# Patient Record
Sex: FEMALE | Race: WHITE | NOT HISPANIC OR LATINO | Employment: OTHER | URBAN - METROPOLITAN AREA
[De-identification: names, ages, dates, MRNs, and addresses within clinical notes are randomized per-mention and may not be internally consistent; named-entity substitution may affect disease eponyms.]

---

## 2024-08-14 ENCOUNTER — HOSPITAL ENCOUNTER (INPATIENT)
Facility: HOSPITAL | Age: 84
LOS: 1 days | Discharge: HOME | DRG: 379 | End: 2024-08-15
Attending: SPECIALIST | Admitting: SPECIALIST
Payer: MEDICARE

## 2024-08-14 DIAGNOSIS — K92.2 UPPER GI BLEED: Primary | ICD-10-CM

## 2024-08-14 DIAGNOSIS — K92.2 GI BLEED: ICD-10-CM

## 2024-08-14 DIAGNOSIS — R07.9 CHEST PAIN: ICD-10-CM

## 2024-08-14 PROBLEM — I25.10 CORONARY ARTERY DISEASE INVOLVING NATIVE CORONARY ARTERY OF NATIVE HEART: Status: ACTIVE | Noted: 2024-08-14

## 2024-08-14 PROBLEM — I10 PRIMARY HYPERTENSION: Status: ACTIVE | Noted: 2024-08-14

## 2024-08-14 PROBLEM — E78.5 HYPERLIPIDEMIA: Status: ACTIVE | Noted: 2024-08-14

## 2024-08-14 PROBLEM — I35.0 MODERATE AORTIC STENOSIS: Status: ACTIVE | Noted: 2024-08-14

## 2024-08-14 PROBLEM — E11.9 DIABETES MELLITUS TYPE 2, NONINSULIN DEPENDENT: Status: ACTIVE | Noted: 2024-08-14

## 2024-08-14 LAB
ABO + RH BLD: NORMAL
BLD GP AB SCN CELLS X3 SERPL QL: NORMAL
FERRITIN SERPL-MCNC: 66 NG/ML (ref 20–300)
INR PPP: 1 (ref 0.8–1.2)
POCT GLUCOSE: 245 MG/DL (ref 70–110)
PROTHROMBIN TIME: 11.1 SEC (ref 9–12.5)
SPECIMEN OUTDATE: NORMAL
T4 FREE SERPL-MCNC: 0.95 NG/DL (ref 0.71–1.51)
TSH SERPL DL<=0.005 MIU/L-ACNC: 4.04 UIU/ML (ref 0.4–4)

## 2024-08-14 PROCEDURE — 96375 TX/PRO/DX INJ NEW DRUG ADDON: CPT

## 2024-08-14 PROCEDURE — 84439 ASSAY OF FREE THYROXINE: CPT | Performed by: INTERNAL MEDICINE

## 2024-08-14 PROCEDURE — 82607 VITAMIN B-12: CPT | Performed by: INTERNAL MEDICINE

## 2024-08-14 PROCEDURE — 82728 ASSAY OF FERRITIN: CPT | Performed by: INTERNAL MEDICINE

## 2024-08-14 PROCEDURE — 83540 ASSAY OF IRON: CPT | Performed by: INTERNAL MEDICINE

## 2024-08-14 PROCEDURE — 86850 RBC ANTIBODY SCREEN: CPT | Performed by: INTERNAL MEDICINE

## 2024-08-14 PROCEDURE — 96372 THER/PROPH/DIAG INJ SC/IM: CPT | Performed by: INTERNAL MEDICINE

## 2024-08-14 PROCEDURE — 96374 THER/PROPH/DIAG INJ IV PUSH: CPT

## 2024-08-14 PROCEDURE — 84443 ASSAY THYROID STIM HORMONE: CPT | Performed by: INTERNAL MEDICINE

## 2024-08-14 PROCEDURE — G0378 HOSPITAL OBSERVATION PER HR: HCPCS

## 2024-08-14 PROCEDURE — 86901 BLOOD TYPING SEROLOGIC RH(D): CPT | Performed by: INTERNAL MEDICINE

## 2024-08-14 PROCEDURE — 96361 HYDRATE IV INFUSION ADD-ON: CPT

## 2024-08-14 PROCEDURE — 63600175 PHARM REV CODE 636 W HCPCS: Performed by: INTERNAL MEDICINE

## 2024-08-14 PROCEDURE — 86900 BLOOD TYPING SEROLOGIC ABO: CPT | Performed by: INTERNAL MEDICINE

## 2024-08-14 PROCEDURE — 82746 ASSAY OF FOLIC ACID SERUM: CPT | Performed by: INTERNAL MEDICINE

## 2024-08-14 PROCEDURE — 36415 COLL VENOUS BLD VENIPUNCTURE: CPT | Performed by: INTERNAL MEDICINE

## 2024-08-14 PROCEDURE — 85610 PROTHROMBIN TIME: CPT | Performed by: INTERNAL MEDICINE

## 2024-08-14 RX ORDER — NALOXONE HCL 0.4 MG/ML
0.02 VIAL (ML) INJECTION
Status: DISCONTINUED | OUTPATIENT
Start: 2024-08-14 | End: 2024-08-16 | Stop reason: HOSPADM

## 2024-08-14 RX ORDER — INSULIN ASPART 100 [IU]/ML
0-10 INJECTION, SOLUTION INTRAVENOUS; SUBCUTANEOUS EVERY 6 HOURS PRN
Status: DISCONTINUED | OUTPATIENT
Start: 2024-08-14 | End: 2024-08-16 | Stop reason: HOSPADM

## 2024-08-14 RX ORDER — SODIUM CHLORIDE, SODIUM LACTATE, POTASSIUM CHLORIDE, CALCIUM CHLORIDE 600; 310; 30; 20 MG/100ML; MG/100ML; MG/100ML; MG/100ML
INJECTION, SOLUTION INTRAVENOUS CONTINUOUS
Status: DISCONTINUED | OUTPATIENT
Start: 2024-08-14 | End: 2024-08-15

## 2024-08-14 RX ORDER — IBUPROFEN 200 MG
16 TABLET ORAL
Status: DISCONTINUED | OUTPATIENT
Start: 2024-08-14 | End: 2024-08-16 | Stop reason: HOSPADM

## 2024-08-14 RX ORDER — GLUCAGON 1 MG
1 KIT INJECTION
Status: DISCONTINUED | OUTPATIENT
Start: 2024-08-14 | End: 2024-08-16 | Stop reason: HOSPADM

## 2024-08-14 RX ORDER — ACETAMINOPHEN 650 MG/1
650 SUPPOSITORY RECTAL EVERY 6 HOURS PRN
Status: DISCONTINUED | OUTPATIENT
Start: 2024-08-14 | End: 2024-08-16 | Stop reason: HOSPADM

## 2024-08-14 RX ORDER — ONDANSETRON HYDROCHLORIDE 2 MG/ML
4 INJECTION, SOLUTION INTRAVENOUS EVERY 6 HOURS PRN
Status: DISCONTINUED | OUTPATIENT
Start: 2024-08-14 | End: 2024-08-16 | Stop reason: HOSPADM

## 2024-08-14 RX ORDER — PANTOPRAZOLE SODIUM 40 MG/10ML
40 INJECTION, POWDER, LYOPHILIZED, FOR SOLUTION INTRAVENOUS 2 TIMES DAILY
Status: DISCONTINUED | OUTPATIENT
Start: 2024-08-14 | End: 2024-08-16 | Stop reason: HOSPADM

## 2024-08-14 RX ORDER — IBUPROFEN 200 MG
24 TABLET ORAL
Status: DISCONTINUED | OUTPATIENT
Start: 2024-08-14 | End: 2024-08-16 | Stop reason: HOSPADM

## 2024-08-14 RX ORDER — HYDRALAZINE HYDROCHLORIDE 20 MG/ML
10 INJECTION INTRAMUSCULAR; INTRAVENOUS EVERY 6 HOURS PRN
Status: DISCONTINUED | OUTPATIENT
Start: 2024-08-14 | End: 2024-08-16 | Stop reason: HOSPADM

## 2024-08-14 RX ORDER — SODIUM CHLORIDE 0.9 % (FLUSH) 0.9 %
10 SYRINGE (ML) INJECTION EVERY 12 HOURS PRN
Status: DISCONTINUED | OUTPATIENT
Start: 2024-08-14 | End: 2024-08-16 | Stop reason: HOSPADM

## 2024-08-14 RX ADMIN — PANTOPRAZOLE SODIUM 40 MG: 40 INJECTION, POWDER, FOR SOLUTION INTRAVENOUS at 09:08

## 2024-08-14 RX ADMIN — INSULIN ASPART 2 UNITS: 100 INJECTION, SOLUTION INTRAVENOUS; SUBCUTANEOUS at 11:08

## 2024-08-14 RX ADMIN — HYDRALAZINE HYDROCHLORIDE 10 MG: 20 INJECTION, SOLUTION INTRAMUSCULAR; INTRAVENOUS at 09:08

## 2024-08-14 RX ADMIN — SODIUM CHLORIDE, POTASSIUM CHLORIDE, SODIUM LACTATE AND CALCIUM CHLORIDE: 600; 310; 30; 20 INJECTION, SOLUTION INTRAVENOUS at 09:08

## 2024-08-15 ENCOUNTER — TELEPHONE (OUTPATIENT)
Dept: PREADMISSION TESTING | Facility: HOSPITAL | Age: 84
End: 2024-08-15
Payer: MEDICARE

## 2024-08-15 VITALS
WEIGHT: 150.13 LBS | DIASTOLIC BLOOD PRESSURE: 51 MMHG | TEMPERATURE: 97 F | RESPIRATION RATE: 18 BRPM | OXYGEN SATURATION: 98 % | HEIGHT: 63 IN | BODY MASS INDEX: 26.6 KG/M2 | SYSTOLIC BLOOD PRESSURE: 97 MMHG | HEART RATE: 59 BPM

## 2024-08-15 DIAGNOSIS — K92.1 MELENA: Primary | ICD-10-CM

## 2024-08-15 DIAGNOSIS — D62 ACUTE BLOOD LOSS ANEMIA: ICD-10-CM

## 2024-08-15 LAB
ALBUMIN SERPL BCP-MCNC: 3.5 G/DL (ref 3.5–5.2)
ALP SERPL-CCNC: 80 U/L (ref 55–135)
ALT SERPL W/O P-5'-P-CCNC: 15 U/L (ref 10–44)
ANION GAP SERPL CALC-SCNC: 10 MMOL/L (ref 8–16)
AST SERPL-CCNC: 14 U/L (ref 10–40)
BASOPHILS # BLD AUTO: 0.05 K/UL (ref 0–0.2)
BASOPHILS # BLD AUTO: 0.06 K/UL (ref 0–0.2)
BASOPHILS NFR BLD: 0.5 % (ref 0–1.9)
BASOPHILS NFR BLD: 0.6 % (ref 0–1.9)
BILIRUB SERPL-MCNC: 0.7 MG/DL (ref 0.1–1)
BUN SERPL-MCNC: 20 MG/DL (ref 8–23)
CALCIUM SERPL-MCNC: 8.9 MG/DL (ref 8.7–10.5)
CHLORIDE SERPL-SCNC: 109 MMOL/L (ref 95–110)
CO2 SERPL-SCNC: 23 MMOL/L (ref 23–29)
CREAT SERPL-MCNC: 0.8 MG/DL (ref 0.5–1.4)
DIFFERENTIAL METHOD BLD: ABNORMAL
DIFFERENTIAL METHOD BLD: ABNORMAL
EOSINOPHIL # BLD AUTO: 0.1 K/UL (ref 0–0.5)
EOSINOPHIL # BLD AUTO: 0.2 K/UL (ref 0–0.5)
EOSINOPHIL NFR BLD: 0.9 % (ref 0–8)
EOSINOPHIL NFR BLD: 2.4 % (ref 0–8)
ERYTHROCYTE [DISTWIDTH] IN BLOOD BY AUTOMATED COUNT: 13.1 % (ref 11.5–14.5)
ERYTHROCYTE [DISTWIDTH] IN BLOOD BY AUTOMATED COUNT: 13.1 % (ref 11.5–14.5)
EST. GFR  (NO RACE VARIABLE): >60 ML/MIN/1.73 M^2
FOLATE SERPL-MCNC: 9.7 NG/ML (ref 4–24)
GLUCOSE SERPL-MCNC: 199 MG/DL (ref 70–110)
HCT VFR BLD AUTO: 27.5 % (ref 37–48.5)
HCT VFR BLD AUTO: 28.8 % (ref 37–48.5)
HCT VFR BLD AUTO: 28.8 % (ref 37–48.5)
HCT VFR BLD AUTO: 29.2 % (ref 37–48.5)
HGB BLD-MCNC: 9.2 G/DL (ref 12–16)
HGB BLD-MCNC: 9.3 G/DL (ref 12–16)
IMM GRANULOCYTES # BLD AUTO: 0.05 K/UL (ref 0–0.04)
IMM GRANULOCYTES # BLD AUTO: 0.09 K/UL (ref 0–0.04)
IMM GRANULOCYTES NFR BLD AUTO: 0.5 % (ref 0–0.5)
IMM GRANULOCYTES NFR BLD AUTO: 0.8 % (ref 0–0.5)
IRON SERPL-MCNC: 52 UG/DL (ref 30–160)
LYMPHOCYTES # BLD AUTO: 1.2 K/UL (ref 1–4.8)
LYMPHOCYTES # BLD AUTO: 2 K/UL (ref 1–4.8)
LYMPHOCYTES NFR BLD: 11.1 % (ref 18–48)
LYMPHOCYTES NFR BLD: 20.2 % (ref 18–48)
MCH RBC QN AUTO: 28.1 PG (ref 27–31)
MCH RBC QN AUTO: 28.4 PG (ref 27–31)
MCHC RBC AUTO-ENTMCNC: 32.3 G/DL (ref 32–36)
MCHC RBC AUTO-ENTMCNC: 33.5 G/DL (ref 32–36)
MCV RBC AUTO: 85 FL (ref 82–98)
MCV RBC AUTO: 87 FL (ref 82–98)
MONOCYTES # BLD AUTO: 0.5 K/UL (ref 0.3–1)
MONOCYTES # BLD AUTO: 0.7 K/UL (ref 0.3–1)
MONOCYTES NFR BLD: 4.7 % (ref 4–15)
MONOCYTES NFR BLD: 7.3 % (ref 4–15)
NEUTROPHILS # BLD AUTO: 6.6 K/UL (ref 1.8–7.7)
NEUTROPHILS # BLD AUTO: 8.8 K/UL (ref 1.8–7.7)
NEUTROPHILS NFR BLD: 69 % (ref 38–73)
NEUTROPHILS NFR BLD: 82 % (ref 38–73)
NRBC BLD-RTO: 0 /100 WBC
NRBC BLD-RTO: 0 /100 WBC
PLATELET # BLD AUTO: 211 K/UL (ref 150–450)
PLATELET # BLD AUTO: 220 K/UL (ref 150–450)
PMV BLD AUTO: 9.7 FL (ref 9.2–12.9)
PMV BLD AUTO: 9.8 FL (ref 9.2–12.9)
POCT GLUCOSE: 191 MG/DL (ref 70–110)
POCT GLUCOSE: 252 MG/DL (ref 70–110)
POCT GLUCOSE: 399 MG/DL (ref 70–110)
POTASSIUM SERPL-SCNC: 3.8 MMOL/L (ref 3.5–5.1)
PROT SERPL-MCNC: 6.4 G/DL (ref 6–8.4)
RBC # BLD AUTO: 3.24 M/UL (ref 4–5.4)
RBC # BLD AUTO: 3.31 M/UL (ref 4–5.4)
SATURATED IRON: 14 % (ref 20–50)
SODIUM SERPL-SCNC: 142 MMOL/L (ref 136–145)
TOTAL IRON BINDING CAPACITY: 364 UG/DL (ref 250–450)
TRANSFERRIN SERPL-MCNC: 246 MG/DL (ref 200–375)
VIT B12 SERPL-MCNC: 320 PG/ML (ref 210–950)
WBC # BLD AUTO: 10.69 K/UL (ref 3.9–12.7)
WBC # BLD AUTO: 9.63 K/UL (ref 3.9–12.7)

## 2024-08-15 PROCEDURE — 21400001 HC TELEMETRY ROOM

## 2024-08-15 PROCEDURE — 36415 COLL VENOUS BLD VENIPUNCTURE: CPT | Mod: XB | Performed by: INTERNAL MEDICINE

## 2024-08-15 PROCEDURE — 63600175 PHARM REV CODE 636 W HCPCS: Performed by: INTERNAL MEDICINE

## 2024-08-15 PROCEDURE — 85025 COMPLETE CBC W/AUTO DIFF WBC: CPT | Mod: 91 | Performed by: INTERNAL MEDICINE

## 2024-08-15 PROCEDURE — 85018 HEMOGLOBIN: CPT | Performed by: INTERNAL MEDICINE

## 2024-08-15 PROCEDURE — 25000003 PHARM REV CODE 250: Performed by: INTERNAL MEDICINE

## 2024-08-15 PROCEDURE — 99223 1ST HOSP IP/OBS HIGH 75: CPT | Mod: ,,, | Performed by: PHYSICIAN ASSISTANT

## 2024-08-15 PROCEDURE — 96361 HYDRATE IV INFUSION ADD-ON: CPT

## 2024-08-15 PROCEDURE — 36415 COLL VENOUS BLD VENIPUNCTURE: CPT | Performed by: INTERNAL MEDICINE

## 2024-08-15 PROCEDURE — 80053 COMPREHEN METABOLIC PANEL: CPT | Performed by: INTERNAL MEDICINE

## 2024-08-15 PROCEDURE — 85025 COMPLETE CBC W/AUTO DIFF WBC: CPT | Performed by: INTERNAL MEDICINE

## 2024-08-15 PROCEDURE — 85014 HEMATOCRIT: CPT | Performed by: INTERNAL MEDICINE

## 2024-08-15 RX ORDER — CARVEDILOL 12.5 MG/1
25 TABLET ORAL 2 TIMES DAILY WITH MEALS
Status: DISCONTINUED | OUTPATIENT
Start: 2024-08-15 | End: 2024-08-16 | Stop reason: HOSPADM

## 2024-08-15 RX ORDER — METFORMIN HYDROCHLORIDE 500 MG/1
500 TABLET, EXTENDED RELEASE ORAL 2 TIMES DAILY WITH MEALS
COMMUNITY

## 2024-08-15 RX ORDER — ATORVASTATIN CALCIUM 40 MG/1
80 TABLET, FILM COATED ORAL DAILY
Status: DISCONTINUED | OUTPATIENT
Start: 2024-08-15 | End: 2024-08-16 | Stop reason: HOSPADM

## 2024-08-15 RX ORDER — GLIPIZIDE 10 MG/1
10 TABLET ORAL
COMMUNITY

## 2024-08-15 RX ORDER — PANTOPRAZOLE SODIUM 40 MG/1
40 TABLET, DELAYED RELEASE ORAL DAILY
Qty: 30 TABLET | Refills: 0 | Status: SHIPPED | OUTPATIENT
Start: 2024-08-15 | End: 2024-08-23

## 2024-08-15 RX ORDER — MAGNESIUM SULFATE HEPTAHYDRATE 40 MG/ML
2 INJECTION, SOLUTION INTRAVENOUS ONCE
Status: COMPLETED | OUTPATIENT
Start: 2024-08-15 | End: 2024-08-15

## 2024-08-15 RX ORDER — LISINOPRIL 40 MG/1
40 TABLET ORAL DAILY
COMMUNITY

## 2024-08-15 RX ORDER — SERTRALINE HYDROCHLORIDE 50 MG/1
50 TABLET, FILM COATED ORAL DAILY
Status: DISCONTINUED | OUTPATIENT
Start: 2024-08-15 | End: 2024-08-16 | Stop reason: HOSPADM

## 2024-08-15 RX ORDER — CARVEDILOL 25 MG/1
25 TABLET ORAL 2 TIMES DAILY WITH MEALS
COMMUNITY

## 2024-08-15 RX ORDER — SERTRALINE HYDROCHLORIDE 50 MG/1
50 TABLET, FILM COATED ORAL DAILY
COMMUNITY

## 2024-08-15 RX ORDER — ATORVASTATIN CALCIUM 80 MG/1
80 TABLET, FILM COATED ORAL DAILY
COMMUNITY

## 2024-08-15 RX ORDER — CLOPIDOGREL BISULFATE 75 MG/1
75 TABLET ORAL DAILY
COMMUNITY

## 2024-08-15 RX ADMIN — MAGNESIUM SULFATE HEPTAHYDRATE 2 G: 40 INJECTION, SOLUTION INTRAVENOUS at 08:08

## 2024-08-15 RX ADMIN — HYDRALAZINE HYDROCHLORIDE 10 MG: 20 INJECTION, SOLUTION INTRAMUSCULAR; INTRAVENOUS at 09:08

## 2024-08-15 RX ADMIN — ATORVASTATIN CALCIUM 80 MG: 40 TABLET, FILM COATED ORAL at 10:08

## 2024-08-15 RX ADMIN — PANTOPRAZOLE SODIUM 40 MG: 40 INJECTION, POWDER, FOR SOLUTION INTRAVENOUS at 09:08

## 2024-08-15 RX ADMIN — SERTRALINE HYDROCHLORIDE 50 MG: 50 TABLET ORAL at 10:08

## 2024-08-15 RX ADMIN — INSULIN ASPART 10 UNITS: 100 INJECTION, SOLUTION INTRAVENOUS; SUBCUTANEOUS at 12:08

## 2024-08-15 RX ADMIN — CARVEDILOL 25 MG: 12.5 TABLET, FILM COATED ORAL at 10:08

## 2024-08-15 NOTE — HPI
The patient presented to an outside ER with a two days history of black stool and weakness. She was found to have a Hgb of 9.8 which was down from 12.1 on 06/11/2024. MCV, PLT and creatinine normal with BUN 31. She was given 80 mg IV Protonix and transferred here for GI services. Hgb has remained stable. She is hemodynamically stable. No further melena. She denies nausea, vomiting or abdominal pain. She takes Plavix daily for a history of CAD and PVD. No recent acute events. Denies NSAID use. Reports having an EGD more than five years ago with gastritis.

## 2024-08-15 NOTE — ASSESSMENT & PLAN NOTE
Chronic, uncontrolled. Latest blood pressure and vitals reviewed-     Temp:  [97.5 °F (36.4 °C)]   Pulse:  [75-80]   Resp:  [18]   BP: (135-154)/(57-75)   SpO2:  [96 %] .   Home meds for hypertension were reviewed and noted below.       While in the hospital, will manage blood pressure as follows; Adjust home antihypertensive regimen as follows- hold lisinopril and Coreg in the setting of upper GI bleed.  P.r.n. IV hydralazine with parameters.  If blood pressure remains elevated, consider scheduled IV metoprolol versus clonidine patch.    Will utilize p.r.n. blood pressure medication only if patient's blood pressure greater than 160/100 and she develops symptoms such as worsening chest pain or shortness of breath.

## 2024-08-15 NOTE — ASSESSMENT & PLAN NOTE
Patient with known CAD s/p CABG, which is controlled.  Currently NPO due to GI bleed--> held lisinopril, Coreg, Plavix, and Lipitor.  Monitor for S/Sx of angina/ACS. Continue to monitor on telemetry.

## 2024-08-15 NOTE — TELEPHONE ENCOUNTER
----- Message from Pan Pena PA-C sent at 8/15/2024 10:11 AM CDT -----  FYI, she is scheduled for outpatient procedure on Tuesday. She is in hospital now and we are having her hold plavix.   Pan

## 2024-08-15 NOTE — PLAN OF CARE
Problem: Adult Inpatient Plan of Care  Goal: Plan of Care Review  Reactivated  Goal: Patient-Specific Goal (Individualized)  Reactivated  Goal: Absence of Hospital-Acquired Illness or Injury  Reactivated  Goal: Optimal Comfort and Wellbeing  Reactivated  Goal: Readiness for Transition of Care  Reactivated     Problem: Diabetes Comorbidity  Goal: Blood Glucose Level Within Targeted Range  Reactivated     Problem: Fall Injury Risk  Goal: Absence of Fall and Fall-Related Injury  Reactivated     Problem: Gastrointestinal Bleeding  Goal: Optimal Coping with Acute Illness  Reactivated  Goal: Hemostasis  Reactivated

## 2024-08-15 NOTE — PLAN OF CARE
A246/A246 YAYA Perez is a 84 y.o.female admitted on 8/14/2024 for Upper GI bleed   Code Status: Full Code MRN: [id number]   Review of patient's allergies indicates:  No Known Allergies  Past Medical History:   Diagnosis Date    Bilateral carotid artery stenosis     Coronary artery disease     Diabetes mellitus     Hyperlipidemia     Hypertension     Moderate aortic stenosis     PVD (peripheral vascular disease)       PRN meds    acetaminophen, 650 mg, Q6H PRN  dextrose 10%, 12.5 g, PRN  dextrose 10%, 12.5 g, PRN  dextrose 10%, 25 g, PRN  dextrose 10%, 25 g, PRN  glucagon (human recombinant), 1 mg, PRN  glucagon (human recombinant), 1 mg, PRN  glucose, 16 g, PRN  glucose, 24 g, PRN  hydrALAZINE, 10 mg, Q6H PRN  insulin aspart U-100, 0-10 Units, Q6H PRN  naloxone, 0.02 mg, PRN  ondansetron, 4 mg, Q6H PRN  sodium chloride 0.9%, 10 mL, Q12H PRN      Discharge instructions received and reviewed with pt and family at bedside.  Pt voiced understanding and all questions answered to satisfaction.  Stressed importance to making and keeping all follow up appointments.  Medications sent to pt pharmacy and reviewed with pt.  Tele monitor removed and brought to monitor tech.  IV d/c'd with tip intact, pressure dressing applied.  Pt transported to front of hospital via w/c by PCT to be discharged home.                 Lead Monitored: Lead II Rhythm: normal sinus rhythm    Cardiac/Telemetry Box Number: [id number]  VTE Required Core Measure: (SCDs) Sequential compression device initiated/maintained Last Bowel Movement: 08/14/24  Diet Cardiac Consistent Carbohydrate  Diet diabetic  Diet Cardiac     Joe Score: 21  Fall Risk Score: 13  Accucheck [x]   Freq? ACHS     Lines/Drains/Airways       Peripheral Intravenous Line  Duration                  Peripheral IV - Single Lumen 08/14/24 1600 20 G Left Antecubital 1 day

## 2024-08-15 NOTE — DISCHARGE SUMMARY
[facility] Medicine  Discharge Summary      Patient Name: Gia Perez  MRN: [id number]  ALEKSANDRA: [id number]  Patient Class: IP- Inpatient  Admission Date: 8/14/2024  Hospital Length of Stay: 1 days  Discharge Date and Time:  08/15/2024 5:37 PM  Attending Physician: Laquita Blandon MD   Discharging Provider: Laquita Blandon MD  Primary Care Provider: Rashad Luna MD    Primary Care Team: Networked reference to record PCT     HPI:   84-year-old white woman with history of coronary artery disease with remote coronary artery bypass graft, hypertension, hyperlipidemia, bilateral carotid artery stenosis, moderate aortic stenosis, non-insulin-dependent diabetes mellitus type 2, and peripheral vascular disease who was transferred from [facility] to our facility as patient requires GI consult for upper GI bleed.  Patient had presented with complaint of black tarry stools and generalized weakness over the last 2 days, symptoms were constant, and moderate severity, no aggravating or alleviating factors identified.  Patient has had some associated mild epigastric abdominal pain.  She denies hematochezia, hemoptysis, or hematemesis.  She has had no nausea, vomiting, fevers, chills.  Patient denies alcohol or NSAID use.  She is on Plavix for known heart disease.    * No surgery found *      Hospital Course:   Pt had no further melena after admission, no overt s/s of bleeding.     She was evaluated by GI who recommended outpatient EGD after patient has been off Plavix x 5  days as she has had no further bleeding and H/H stable at 9.2-9.3- discussed with GI Dr. Valencia- pt deemed stable for discharge from GI standpoint. Pt is scheduled for outpatient endoscopy with Dr. Cotto on 08/20/2024. Started on Protonix 40 daily x 30 days on discharge.     Pt seen and examined on day of discharge with son at bedside. She is sitting up in chair, in NAD. Denies nausea, vomiting. Had formed  BM earlier this afternoon. Appears stable for discharge home today per my exam with close outpatient GI follow up as above. Advised to hold Plavix on discharge until GI followup. Discharge instructions, medications, followup and return precautions discussed at length with patient and son, all questions answered.      Goals of Care Treatment Preferences:  Code Status: Full Code         Consults:   Consults (From admission, onward)          Status Ordering Provider     Inpatient consult to Diabetes educator  Once        Provider:  (Not yet assigned)    Completed GITA SWANSON     Inpatient consult to Gastroenterology  Once        Provider:  Talita Valencia MD    Completed LOUIS DINH new Assessment & Plan notes have been filed under this hospital service since the last note was generated.  Service: Hospital Medicine    Final Active Diagnoses:    Diagnosis Date Noted POA    PRINCIPAL PROBLEM:  Upper GI bleed [K92.2] 08/14/2024 Yes    Coronary artery disease involving native coronary artery of native heart [I25.10] 08/14/2024 Yes    Primary hypertension [I10] 08/14/2024 Yes    Diabetes mellitus type 2, noninsulin dependent [E11.9] 08/14/2024 Yes    Hyperlipidemia [E78.5] 08/14/2024 Yes    Moderate aortic stenosis [I35.0] 08/14/2024 Yes      Problems Resolved During this Admission:       Discharged Condition: stable    Disposition: Home or Self Care    Follow Up:   Follow-up Information       Rashad Luna MD. Schedule an appointment as soon as possible for a visit in 3 day(s).    Specialty: Family Medicine  Contact information:  [address] [place] [id number] [phone]               Kate Cotto MD Follow up on 8/20/2024.    Specialty: Gastroenterology  Why: follow up as scheduled for upper endoscopy  Contact information:  [address] [facility] [address] [phone]                           Patient Instructions:      Diet diabetic     Diet  Cardiac     Notify your health care provider if you experience any of the following:   Order Comments: Any signs or symptoms of bleeding     Activity as tolerated       Significant Diagnostic Studies: See Hospital Course     Pending Diagnostic Studies:       None           Medications:  Reconciled Home Medications:      Medication List        START taking these medications      pantoprazole 40 MG tablet  Commonly known as: PROTONIX  Take 1 tablet (40 mg total) by mouth once daily.            CONTINUE taking these medications      atorvastatin 80 MG tablet  Commonly known as: LIPITOR  Take 80 mg by mouth once daily.     carvediloL 25 MG tablet  Commonly known as: COREG  Take 25 mg by mouth 2 (two) times daily with meals.     clopidogreL 75 mg tablet  Commonly known as: PLAVIX  Take 75 mg by mouth once daily.  Notes to patient: DO NOT TAKE UNTIL AFTER EGD IS COMPLETED      glipiZIDE 10 MG tablet  Commonly known as: GLUCOTROL  Take 10 mg by mouth 2 (two) times daily before meals.     lisinopriL 40 MG tablet  Commonly known as: PRINIVIL,ZESTRIL  Take 40 mg by mouth once daily.  Notes to patient: Please check BP before restarting, ok to restart if BP > 120      metFORMIN 500 MG ER 24hr tablet  Commonly known as: GLUCOPHAGE-XR  Take 500 mg by mouth 2 (two) times daily with meals.     sertraline 50 MG tablet  Commonly known as: ZOLOFT  Take 50 mg by mouth once daily.              Indwelling Lines/Drains at time of discharge:   Lines/Drains/Airways       None                   Time spent on the discharge of patient: 37 minutes         Laquita Blandon MD  Department of Hospital Medicine  [facility] - Telemetry ([facility])

## 2024-08-15 NOTE — H&P
[facility] Medicine  History & Physical    Patient Name: Gia Perez  MRN: [id number]  Patient Class: IP- Inpatient  Admission Date: 8/14/2024  Attending Physician:  Zenaida Kahn MD  Primary Care Provider: Rashad Luna MD         Patient information was obtained from patient, ER records, and ER physician .     Subjective:     Principal Problem:<principal problem not specified>    Chief Complaint:   Chief Complaint   Patient presents with    Transferred from outside hospital for upper GI bleed requir        HPI: 84-year-old white woman with history of coronary artery disease with remote coronary artery bypass graft, hypertension, hyperlipidemia, bilateral carotid artery stenosis, moderate aortic stenosis, non-insulin-dependent diabetes mellitus type 2, and peripheral vascular disease who was transferred from [facility] to our facility as patient requires GI consult for upper GI bleed.  Patient had presented with complaint of black tarry stools and generalized weakness over the last 2 days, symptoms were constant, and moderate severity, no aggravating or alleviating factors identified.  Patient has had some associated mild epigastric abdominal pain.  She denies hematochezia, hemoptysis, or hematemesis.  She has had no nausea, vomiting, fevers, chills.  Patient denies alcohol or NSAID use.  She is on Plavix for known heart disease.    Past Medical History:   Diagnosis Date    Bilateral carotid artery stenosis     Coronary artery disease     Diabetes mellitus     Hyperlipidemia     Hypertension     Moderate aortic stenosis     PVD (peripheral vascular disease)        Past Surgical History:   Procedure Laterality Date    CORONARY ARTERY BYPASS GRAFT         Review of patient's allergies indicates:  No Known Allergies    No current facility-administered medications on file prior to encounter.     No current outpatient medications on file prior to encounter.     Family  History       Problem Relation (Age of Onset)    Heart disease Mother          Tobacco Use    Smoking status: Never    Smokeless tobacco: Never   Substance and Sexual Activity    Alcohol use: Not Currently    Drug use: Never    Sexual activity: Not on file     Review of Systems   Constitutional:  Negative for chills and fever.   Gastrointestinal:  Positive for abdominal distention and blood in stool. Negative for nausea and vomiting.   Neurological:  Positive for weakness. Negative for syncope.   All other systems reviewed and are negative.    Objective:     Vital Signs (Most Recent):    Vital Signs (24h Range):  Temp:  [97.5 °F (36.4 °C)] 97.5 °F (36.4 °C)  Pulse:  [75-80] 80  Resp:  [18] 18  SpO2:  [96 %] 96 %  BP: (135-154)/(57-75) 135/57        There is no height or weight on file to calculate BMI.     Physical Exam  Vitals reviewed.   Constitutional:       General: She is not in acute distress.     Appearance: She is not ill-appearing.   HENT:      Nose: Nose normal.   Eyes:      Conjunctiva/sclera: Conjunctivae normal.   Cardiovascular:      Rate and Rhythm: Normal rate.   Pulmonary:      Effort: Pulmonary effort is normal. No respiratory distress.   Abdominal:      General: There is no distension.      Tenderness: There is no abdominal tenderness.   Musculoskeletal:         General: No swelling.   Skin:     General: Skin is warm and dry.   Neurological:      Mental Status: She is alert and oriented to person, place, and time.   Psychiatric:         Mood and Affect: Mood normal.         Behavior: Behavior normal.                Significant Labs: All pertinent labs within the past 24 hours have been reviewed.  Recent Lab Results         08/14/24  1243        Magnesium  1.4               Significant Imaging: I have reviewed all pertinent imaging results/findings within the past 24 hours.  No orders to display      No orders to display      Assessment/Plan:     Upper GI bleed  Upper GI bleed with melena,  generalized weakness, and symptomatic anemia.  Patient has acute blood loss due to hemorrhage, the hemorrhage is due to gastrointestinal bleed, patient does have a propensity for bleeding due to a medication, the medication is Plavix.. Will trend hemoglobin/hematocrit Every 6 hours x4, as well as monitor and correct for any coagulation defects. CBC and vital signs have been reviewed and last CBC was noted- H&H at outside hospital was 9.8 and 29.6, previously with a hemoglobin of 12.1 on 06/11/2024      Will order a type and screen and consent patient for blood transfusion. Will transfuse if Hgb is <7g/dl (<8g/dl in cases of active ACS) or if patient has rapid bleeding leading to hemodynamic instability.  -check H&H q.6 hours x4  -check CBC for a.m.  -check iron studies, B12, folate, TSH, and INR  -patient received Protonix 80 mg IV x1 dose at outside hospital  -NPO, cautious IV fluids given advanced age, and Protonix 40 mg IV b.i.d.  -GI consult for a.m.  -hold Plavix  -fall precautions, telemetry monitoring    Coronary artery disease involving native coronary artery of native heart  Patient with known CAD s/p CABG, which is controlled.  Currently NPO due to GI bleed--> held lisinopril, Coreg, Plavix, and Lipitor.  Monitor for S/Sx of angina/ACS. Continue to monitor on telemetry.     Primary hypertension  Chronic, uncontrolled. Latest blood pressure and vitals reviewed-     Temp:  [97.5 °F (36.4 °C)]   Pulse:  [75-80]   Resp:  [18]   BP: (135-154)/(57-75)   SpO2:  [96 %] .   Home meds for hypertension were reviewed and noted below.       While in the hospital, will manage blood pressure as follows; Adjust home antihypertensive regimen as follows- hold lisinopril and Coreg in the setting of upper GI bleed.  P.r.n. IV hydralazine with parameters.  If blood pressure remains elevated, consider scheduled IV metoprolol versus clonidine patch.    Will utilize p.r.n. blood pressure medication only if patient's blood  "pressure greater than 160/100 and she develops symptoms such as worsening chest pain or shortness of breath.    Diabetes mellitus type 2, noninsulin dependent  Patient's FSGs are uncontrolled due to hyperglycemia on current medication regimen.  Last A1c reviewed-   Lab Results   Component Value Date    HGBA1C 9.0 (H) 06/11/2024     Most recent fingerstick glucose reviewed- No results for input(s): "POCTGLUCOSE" in the last 24 hours.  Current correctional scale  Medium  Maintain anti-hyperglycemic dose as follows-   Antihyperglycemics (From admission, onward)      Start     Stop Route Frequency Ordered    08/14/24 2206  insulin aspart U-100 pen 0-10 Units         -- SubQ Every 6 hours PRN 08/14/24 2106          Hold Oral hypoglycemics while patient is in the hospital.  -hold glipizide and Glucophage  -given significantly elevated A1c, patient would likely benefit from basal insulin at discharge  -currently NPO with cautious IV fluids    Hyperlipidemia  Lipitor held while NPO.  Recent lipid panel 06/11/2024 with total cholesterol 153, triglycerides 277, HDL 33, and LDL 75.      Moderate aortic stenosis  Outpatient follow up with Cardiology.  Monitor volume status.      VTE Risk Mitigation (From admission, onward)           Ordered     Reason for No Pharmacological VTE Prophylaxis  Once        Question:  Reasons:  Answer:  Active Bleeding    08/14/24 2106     IP VTE HIGH RISK PATIENT  Once         08/14/24 2106     Place sequential compression device  Until discontinued         08/14/24 2106                                    Zenaida Kahn MD  Department of Hospital Medicine  [facility] - Telemetry ([facility])          "

## 2024-08-15 NOTE — HPI
84-year-old white woman with history of coronary artery disease with remote coronary artery bypass graft, hypertension, hyperlipidemia, bilateral carotid artery stenosis, moderate aortic stenosis, non-insulin-dependent diabetes mellitus type 2, and peripheral vascular disease who was transferred from [facility] to our facility as patient requires GI consult for upper GI bleed.  Patient had presented with complaint of black tarry stools and generalized weakness over the last 2 days, symptoms were constant, and moderate severity, no aggravating or alleviating factors identified.  Patient has had some associated mild epigastric abdominal pain.  She denies hematochezia, hemoptysis, or hematemesis.  She has had no nausea, vomiting, fevers, chills.  Patient denies alcohol or NSAID use.  She is on Plavix for known heart disease.

## 2024-08-15 NOTE — SUBJECTIVE & OBJECTIVE
Past Medical History:   Diagnosis Date    Bilateral carotid artery stenosis     Coronary artery disease     Diabetes mellitus     Hyperlipidemia     Hypertension     Moderate aortic stenosis     PVD (peripheral vascular disease)        Past Surgical History:   Procedure Laterality Date    CORONARY ARTERY BYPASS GRAFT         Review of patient's allergies indicates:  No Known Allergies    No current facility-administered medications on file prior to encounter.     No current outpatient medications on file prior to encounter.     Family History       Problem Relation (Age of Onset)    Heart disease Mother          Tobacco Use    Smoking status: Never    Smokeless tobacco: Never   Substance and Sexual Activity    Alcohol use: Not Currently    Drug use: Never    Sexual activity: Not on file     Review of Systems   Constitutional:  Negative for chills and fever.   Gastrointestinal:  Positive for abdominal distention and blood in stool. Negative for nausea and vomiting.   Neurological:  Positive for weakness. Negative for syncope.   All other systems reviewed and are negative.    Objective:     Vital Signs (Most Recent):    Vital Signs (24h Range):  Temp:  [97.5 °F (36.4 °C)] 97.5 °F (36.4 °C)  Pulse:  [75-80] 80  Resp:  [18] 18  SpO2:  [96 %] 96 %  BP: (135-154)/(57-75) 135/57        There is no height or weight on file to calculate BMI.     Physical Exam  Vitals reviewed.   Constitutional:       General: She is not in acute distress.     Appearance: She is not ill-appearing.   HENT:      Nose: Nose normal.   Eyes:      Conjunctiva/sclera: Conjunctivae normal.   Cardiovascular:      Rate and Rhythm: Normal rate.   Pulmonary:      Effort: Pulmonary effort is normal. No respiratory distress.   Abdominal:      General: There is no distension.      Tenderness: There is no abdominal tenderness.   Musculoskeletal:         General: No swelling.   Skin:     General: Skin is warm and dry.   Neurological:      Mental Status: She  is alert and oriented to person, place, and time.   Psychiatric:         Mood and Affect: Mood normal.         Behavior: Behavior normal.                Significant Labs: All pertinent labs within the past 24 hours have been reviewed.  Recent Lab Results         08/14/24  1243        Magnesium  1.4               Significant Imaging: I have reviewed all pertinent imaging results/findings within the past 24 hours.  No orders to display      No orders to display

## 2024-08-15 NOTE — CONSULTS
Diabetes Education Consult    Screened pt for diabetes education. Consult placed for elevated A1C. The medical records were reviewed.    Current admission diagnosis: Upper GI bleed        Labs:    A1C: 6/11/24, 9      Glucose   Date Value Ref Range Status   08/15/2024 199 (H) 70 - 110 mg/dL Final         Met with pt  for diabetes management practices. Reviewed current A1C and target. Reviewed disease process and medical complications associated with uncontrolled diabetes. Encouraged lifestyle management to include weight loss, increased activity, and appropriate diet as a part of diabetes management.    Current home medications include: glipizide and Metformin. Pt reports taking medications as prescribed. Reviewed medications, how they work and possible S/E.     Pt reports having a meter and supplies at home. Pt checks blood sugars once every other day. Encouraged pt to check BG more frequently. Provided log with recommended testing times and expected results. Discussed S/S of hypoglycemia. Reviewed appropriate treatment options. Reviewed S/S of hyperglycemia.     Discussed carbohydrate containing foods, 30-45 grams carbohydrate choice meal plan using pictures; serving sizes, Plate Method, label reading, and Multistat Raad Issa. Recommended lean protein and healthy fat with meals and snacks. Encouraged to avoid obvious sources of sugar.    Reviewed the diabetes care checklist and the importance of regular follow up with physician. Left diabetes education packet for reference and contact information. Encouraged pt to contact diabetes education team with any questions or concerns.    Recommendations:   Outpatient diabetes education referral. Pt declined at this time.     Michelle Rincon RN  Diabetes Education

## 2024-08-15 NOTE — SUBJECTIVE & OBJECTIVE
Past Medical History:   Diagnosis Date    Bilateral carotid artery stenosis     Coronary artery disease     Diabetes mellitus     Hyperlipidemia     Hypertension     Moderate aortic stenosis     PVD (peripheral vascular disease)        Past Surgical History:   Procedure Laterality Date    CORONARY ARTERY BYPASS GRAFT         Review of patient's allergies indicates:  No Known Allergies  Family History       Problem Relation (Age of Onset)    Heart disease Mother          Tobacco Use    Smoking status: Never    Smokeless tobacco: Never   Substance and Sexual Activity    Alcohol use: Not Currently    Drug use: Never    Sexual activity: Not on file     Review of Systems   Constitutional:  Negative for appetite change and fever.   HENT:  Negative for hearing loss.    Eyes:  Negative for visual disturbance.   Respiratory:  Negative for cough and shortness of breath.    Cardiovascular:  Negative for chest pain.   Gastrointestinal:         As per HPI.   Genitourinary:  Negative for dysuria, frequency and hematuria.   Musculoskeletal:  Negative for arthralgias and back pain.   Skin:  Negative for rash.   Neurological:  Positive for weakness. Negative for seizures, syncope, numbness and headaches.   Hematological:  Does not bruise/bleed easily.   Psychiatric/Behavioral:  The patient is not nervous/anxious.      Objective:     Vital Signs (Most Recent):  Temp: 98.1 °F (36.7 °C) (08/15/24 0810)  Pulse: 64 (08/15/24 0810)  Resp: 18 (08/15/24 0810)  BP: (!) 174/76 (08/15/24 0810)  SpO2: 98 % (08/15/24 0810) Vital Signs (24h Range):  Temp:  [97.5 °F (36.4 °C)-98.1 °F (36.7 °C)] 98.1 °F (36.7 °C)  Pulse:  [] 64  Resp:  [17-22] 18  SpO2:  [96 %-99 %] 98 %  BP: (135-219)/(57-89) 174/76     Weight: 68.1 kg (150 lb 2.1 oz) (08/15/24 0418)  Body mass index is 26.59 kg/m².      Intake/Output Summary (Last 24 hours) at 8/15/2024 0822  Last data filed at 8/15/2024 0445  Gross per 24 hour   Intake 325.26 ml   Output --   Net 325.26 ml        Lines/Drains/Airways       Peripheral Intravenous Line  Duration                  Peripheral IV - Single Lumen 08/14/24 1600 20 G Left Antecubital <1 day                     Physical Exam  Constitutional:       Appearance: Normal appearance.   HENT:      Head: Normocephalic and atraumatic.   Eyes:      Extraocular Movements: Extraocular movements intact.   Cardiovascular:      Rate and Rhythm: Normal rate and regular rhythm.      Heart sounds: No murmur heard.  Pulmonary:      Effort: Pulmonary effort is normal. No respiratory distress.      Breath sounds: Normal breath sounds. No wheezing.   Abdominal:      General: Bowel sounds are normal. There is no distension.      Palpations: Abdomen is soft. There is no mass.      Tenderness: There is no abdominal tenderness.   Musculoskeletal:      Cervical back: Normal range of motion and neck supple.      Right lower leg: No edema.      Left lower leg: No edema.   Skin:     General: Skin is warm and dry.      Findings: No rash.   Neurological:      Mental Status: She is alert and oriented to person, place, and time.      Cranial Nerves: No cranial nerve deficit.   Psychiatric:         Behavior: Behavior normal.          Significant Labs:  CBC:   Recent Labs   Lab 08/15/24  0005 08/15/24  0645   WBC  --  9.63   HGB 9.3* 9.3*  9.3*   HCT 29.2* 28.8*  28.8*   PLT  --  211     CMP:   Recent Labs   Lab 08/15/24  0645   *   CALCIUM 8.9   ALBUMIN 3.5   PROT 6.4      K 3.8   CO2 23      BUN 20   CREATININE 0.8   ALKPHOS 80   ALT 15   AST 14   BILITOT 0.7     Coagulation:   Recent Labs   Lab 08/14/24  2152   INR 1.0       Significant Imaging:  Imaging results within the past 24 hours have been reviewed.

## 2024-08-15 NOTE — H&P (VIEW-ONLY)
[facility] - Telemetry ([facility])  Gastroenterology  Consult Note    Patient Name: Gia Perez  MRN: [id number]  Admission Date: 8/14/2024  Hospital Length of Stay: 1 days  Code Status: Full Code   Attending Provider: Laquita Blandon MD   Consulting Provider: Pan Pena PA-C  Primary Care Physician: Rashad Luna MD  Principal Problem:<principal problem not specified>    Inpatient consult to Gastroenterology  Consult performed by: Pan Pena PA-C  Consult ordered by: Zenaida Kahn MD  Reason for consult: Melena and anemia        Subjective:     HPI:  The patient presented to an outside ER with a two days history of black stool and weakness. She was found to have a Hgb of 9.8 which was down from 12.1 on 06/11/2024. MCV, PLT and creatinine normal with BUN 31. She was given 80 mg IV Protonix and transferred here for GI services. Hgb has remained stable. She is hemodynamically stable. No further melena. She denies nausea, vomiting or abdominal pain. She takes Plavix daily for a history of CAD and PVD. No recent acute events. Denies NSAID use. Reports having an EGD more than five years ago with gastritis.    Past Medical History:   Diagnosis Date    Bilateral carotid artery stenosis     Coronary artery disease     Diabetes mellitus     Hyperlipidemia     Hypertension     Moderate aortic stenosis     PVD (peripheral vascular disease)        Past Surgical History:   Procedure Laterality Date    CORONARY ARTERY BYPASS GRAFT         Review of patient's allergies indicates:  No Known Allergies  Family History       Problem Relation (Age of Onset)    Heart disease Mother          Tobacco Use    Smoking status: Never    Smokeless tobacco: Never   Substance and Sexual Activity    Alcohol use: Not Currently    Drug use: Never    Sexual activity: Not on file     Review of Systems   Constitutional:  Negative for appetite change and fever.   HENT:  Negative for hearing loss.    Eyes:  Negative for visual  disturbance.   Respiratory:  Negative for cough and shortness of breath.    Cardiovascular:  Negative for chest pain.   Gastrointestinal:         As per HPI.   Genitourinary:  Negative for dysuria, frequency and hematuria.   Musculoskeletal:  Negative for arthralgias and back pain.   Skin:  Negative for rash.   Neurological:  Positive for weakness. Negative for seizures, syncope, numbness and headaches.   Hematological:  Does not bruise/bleed easily.   Psychiatric/Behavioral:  The patient is not nervous/anxious.      Objective:     Vital Signs (Most Recent):  Temp: 98.1 °F (36.7 °C) (08/15/24 0810)  Pulse: 64 (08/15/24 0810)  Resp: 18 (08/15/24 0810)  BP: (!) 174/76 (08/15/24 0810)  SpO2: 98 % (08/15/24 0810) Vital Signs (24h Range):  Temp:  [97.5 °F (36.4 °C)-98.1 °F (36.7 °C)] 98.1 °F (36.7 °C)  Pulse:  [] 64  Resp:  [17-22] 18  SpO2:  [96 %-99 %] 98 %  BP: (135-219)/(57-89) 174/76     Weight: 68.1 kg (150 lb 2.1 oz) (08/15/24 0418)  Body mass index is 26.59 kg/m².      Intake/Output Summary (Last 24 hours) at 8/15/2024 0822  Last data filed at 8/15/2024 0446  Gross per 24 hour   Intake 325.26 ml   Output --   Net 325.26 ml       Lines/Drains/Airways       Peripheral Intravenous Line  Duration                  Peripheral IV - Single Lumen 08/14/24 1600 20 G Left Antecubital <1 day                     Physical Exam  Constitutional:       Appearance: Normal appearance.   HENT:      Head: Normocephalic and atraumatic.   Eyes:      Extraocular Movements: Extraocular movements intact.   Cardiovascular:      Rate and Rhythm: Normal rate and regular rhythm.      Heart sounds: No murmur heard.  Pulmonary:      Effort: Pulmonary effort is normal. No respiratory distress.      Breath sounds: Normal breath sounds. No wheezing.   Abdominal:      General: Bowel sounds are normal. There is no distension.      Palpations: Abdomen is soft. There is no mass.      Tenderness: There is no abdominal tenderness.    Musculoskeletal:      Cervical back: Normal range of motion and neck supple.      Right lower leg: No edema.      Left lower leg: No edema.   Skin:     General: Skin is warm and dry.      Findings: No rash.   Neurological:      Mental Status: She is alert and oriented to person, place, and time.      Cranial Nerves: No cranial nerve deficit.   Psychiatric:         Behavior: Behavior normal.          Significant Labs:  CBC:   Recent Labs   Lab 08/15/24  0005 08/15/24  0645   WBC  --  9.63   HGB 9.3* 9.3*  9.3*   HCT 29.2* 28.8*  28.8*   PLT  --  211     CMP:   Recent Labs   Lab 08/15/24  0645   *   CALCIUM 8.9   ALBUMIN 3.5   PROT 6.4      K 3.8   CO2 23      BUN 20   CREATININE 0.8   ALKPHOS 80   ALT 15   AST 14   BILITOT 0.7     Coagulation:   Recent Labs   Lab 08/14/24  2152   INR 1.0       Significant Imaging:  Imaging results within the past 24 hours have been reviewed.  Assessment/Plan:     Cardiac/Vascular  Coronary artery disease involving native coronary artery of native heart  CABG was many years ago. No recent acute events.     GI  Upper GI bleed  No melena since yesterday evening and Hgb stable.   Ok to start diet and will plan for EGD next week after Plavix held 5 days. Last Plavix 08/13/24.  Continue Protonix bid.      Thank you for your consult. I will follow-up with patient. Please contact us if you have any additional questions.    Pan Pena PA-C  Gastroenterology  [facility] - Telemetry ([facility])

## 2024-08-15 NOTE — PLAN OF CARE
[facility] - Telemetry (Hospital)  Initial Discharge Assessment       Primary Care Provider: Rashad Luna MD    Admission Diagnosis: GI bleed [K92.2]    Admission Date: 8/14/2024  Expected Discharge Date: Per Attending    Transition of Care Barriers: None    Payor: [employer] [facility] / Plan: [employer] CHO[employer] / Product Type: Medicare Advantage /     Extended Emergency Contact Information  Primary Emergency Contact: Perez,Eugenio  Mobile Phone: [phone]  Relation: Son    Discharge Plan A: Home with family         [id number] - [place], [place] - [address]  [address]  [place] [address]  Phone: [phone] Fax: [phone]      Initial Assessment (most recent)       Adult Discharge Assessment - 08/15/24 1110          Discharge Assessment    Assessment Type Discharge Planning Assessment     Confirmed/corrected address, phone number and insurance Yes     Confirmed Demographics Correct on Facesheet     Source of Information patient;family     When was your last doctors appointment? 07/30/24   Timmy Bhakta NP - Family Medicine    Communicated JANINE with patient/caregiver Date not available/Unable to determine     Reason For Admission Upper GI bleed     People in Home alone     Facility Arrived From: [place]     Do you expect to return to your current living situation? Yes     Do you have help at home or someone to help you manage your care at home? Yes     Who are your caregiver(s) and their phone number(s)? adult children - sons Ray Perez/ Jeancarlos Perez and daughter     Prior to hospitilization cognitive status: Alert/Oriented     Current cognitive status: Alert/Oriented     Walking or Climbing Stairs Difficulty no     Dressing/Bathing Difficulty no     Home Accessibility wheelchair accessible     Equipment Currently Used at Home none     Readmission within 30 days? No     Patient currently being followed by outpatient case management? No     Do you  currently have service(s) that help you manage your care at home? No     Do you take prescription medications? Yes     Do you have prescription coverage? Yes     Coverage [employer]     Do you have any problems affording any of your prescribed medications? No     Is the patient taking medications as prescribed? yes     Who is going to help you get home at discharge? adult children - sons Ray Perez/ Jeancarlos Perez and daughter     How do you get to doctors appointments? car, drives self     Are you on dialysis? No     Do you take coumadin? No     Discharge Plan A Home with family     DME Needed Upon Discharge  none     Discharge Plan discussed with: Patient;Adult children     Transition of Care Barriers None        Transportation Needs    Has the lack of transportation kept you from medical appointments, meetings, work or from getting things needed for daily living? No                   Anticipated DC dispo: home with family  Prior Level of Function: independent with ADLs  People in home: alone    Comments:  SW met with patient and son, Jeancarlos, at bedside to introduce role and discuss discharge planning. Patient's adult children will be help at home and can provide transport at time of discharge. Confirmed demographics, insurance, and emergency contacts. SW updated Patient's whiteboard with contact information and anticipated DC plan. CM discharge needs depends on hospital progress. SW will continue following to assist with other needs.

## 2024-08-15 NOTE — PLAN OF CARE
[facility] - Tele[facility] (Hospital)  Discharge Final Note    Primary Care Provider: Rashad Luan MD    Expected Discharge Date: 8/15/2024    Final Discharge Note (most recent)       Final Note - 08/15/24 1605          Final Note    Assessment Type Final Discharge Note     Anticipated Discharge Disposition Home or Self Care        Post-Acute Status    Coverage [employer] Medicare     Discharge Delays None known at this time                     Important Message from Medicare             Contact Info       Rashad Luna MD   Specialty: Family Medicine   Relationship: PCP - General    [address] [place] [address]   Phone: [phone]       Next Steps: Schedule an appointment as soon as possible for a visit in 3 day(s)    Kate Cotto MD   Specialty: Gastroenterology    [address] [place] [address]   Phone: [phone]       Next Steps: Follow up on 8/20/2024    Instructions: follow up as scheduled for upper endoscopy          DC Disposition: home with family  Family Notified: Patient at bedside  Transportation: personal transportation    Patient had no equipment or placement needs from .     Patient has resources to schedule hospital follow up with non-[facility] PCP on S.

## 2024-08-15 NOTE — ASSESSMENT & PLAN NOTE
Lipitor held while NPO.  Recent lipid panel 06/11/2024 with total cholesterol 153, triglycerides 277, HDL 33, and LDL 75.

## 2024-08-15 NOTE — ASSESSMENT & PLAN NOTE
Upper GI bleed with melena, generalized weakness, and symptomatic anemia.  Patient has acute blood loss due to hemorrhage, the hemorrhage is due to gastrointestinal bleed, patient does have a propensity for bleeding due to a medication, the medication is Plavix.. Will trend hemoglobin/hematocrit Every 6 hours x4, as well as monitor and correct for any coagulation defects. CBC and vital signs have been reviewed and last CBC was noted- H&H at outside hospital was 9.8 and 29.6, previously with a hemoglobin of 12.1 on 06/11/2024      Will order a type and screen and consent patient for blood transfusion. Will transfuse if Hgb is <7g/dl (<8g/dl in cases of active ACS) or if patient has rapid bleeding leading to hemodynamic instability.  -check H&H q.6 hours x4  -check CBC for a.m.  -check iron studies, B12, folate, TSH, and INR  -patient received Protonix 80 mg IV x1 dose at outside hospital  -NPO, cautious IV fluids given advanced age, and Protonix 40 mg IV b.i.d.  -GI consult for a.m.  -hold Plavix  -fall precautions, telemetry monitoring

## 2024-08-15 NOTE — ASSESSMENT & PLAN NOTE
No melena since yesterday evening and Hgb stable.   Ok to start diet and will plan for EGD next week after Plavix held 5 days.   Continue Protonix bid.

## 2024-08-15 NOTE — CONSULTS
[facility] - Tele[facility] ([facility])  Gastroenterology  Consult Note    Patient Name: Gia Perez  MRN: [id number]  Admission Date: 8/14/2024  Hospital Length of Stay: 1 days  Code Status: Full Code   Attending Provider: Laquita Blandon MD   Consulting Provider: Pan Pena PA-C  Primary Care Physician: Rashad Luna MD  Principal Problem:<principal problem not specified>    Inpatient consult to Gastroenterology  Consult performed by: Pan Pena PA-C  Consult ordered by: Zenaida Kahn MD  Reason for consult: Melena and anemia        Subjective:     HPI:  The patient presented to an outside ER with a two days history of black stool and weakness. She was found to have a Hgb of 9.8 which was down from 12.1 on 06/11/2024. MCV, PLT and creatinine normal with BUN 31. She was given 80 mg IV Protonix and transferred here for GI services. Hgb has remained stable. She is hemodynamically stable. No further melena. She denies nausea, vomiting or abdominal pain. She takes Plavix daily for a history of CAD and PVD. No recent acute events. Denies NSAID use. Reports having an EGD more than five years ago with gastritis.    Past Medical History:   Diagnosis Date    Bilateral carotid artery stenosis     Coronary artery disease     Diabetes mellitus     Hyperlipidemia     Hypertension     Moderate aortic stenosis     PVD (peripheral vascular disease)        Past Surgical History:   Procedure Laterality Date    CORONARY ARTERY BYPASS GRAFT         Review of patient's allergies indicates:  No Known Allergies  Family History       Problem Relation (Age of Onset)    Heart disease Mother          Tobacco Use    Smoking status: Never    Smokeless tobacco: Never   Substance and Sexual Activity    Alcohol use: Not Currently    Drug use: Never    Sexual activity: Not on file     Review of Systems   Constitutional:  Negative for appetite change and fever.   HENT:  Negative for hearing loss.    Eyes:  Negative for visual  disturbance.   Respiratory:  Negative for cough and shortness of breath.    Cardiovascular:  Negative for chest pain.   Gastrointestinal:         As per HPI.   Genitourinary:  Negative for dysuria, frequency and hematuria.   Musculoskeletal:  Negative for arthralgias and back pain.   Skin:  Negative for rash.   Neurological:  Positive for weakness. Negative for seizures, syncope, numbness and headaches.   Hematological:  Does not bruise/bleed easily.   Psychiatric/Behavioral:  The patient is not nervous/anxious.      Objective:     Vital Signs (Most Recent):  Temp: 98.1 °F (36.7 °C) (08/15/24 0810)  Pulse: 64 (08/15/24 0810)  Resp: 18 (08/15/24 0810)  BP: (!) 174/76 (08/15/24 0810)  SpO2: 98 % (08/15/24 0810) Vital Signs (24h Range):  Temp:  [97.5 °F (36.4 °C)-98.1 °F (36.7 °C)] 98.1 °F (36.7 °C)  Pulse:  [] 64  Resp:  [17-22] 18  SpO2:  [96 %-99 %] 98 %  BP: (135-219)/(57-89) 174/76     Weight: 68.1 kg (150 lb 2.1 oz) (08/15/24 0418)  Body mass index is 26.59 kg/m².      Intake/Output Summary (Last 24 hours) at 8/15/2024 0822  Last data filed at 8/15/2024 0446  Gross per 24 hour   Intake 325.26 ml   Output --   Net 325.26 ml       Lines/Drains/Airways       Peripheral Intravenous Line  Duration                  Peripheral IV - Single Lumen 08/14/24 1600 20 G Left Antecubital <1 day                     Physical Exam  Constitutional:       Appearance: Normal appearance.   HENT:      Head: Normocephalic and atraumatic.   Eyes:      Extraocular Movements: Extraocular movements intact.   Cardiovascular:      Rate and Rhythm: Normal rate and regular rhythm.      Heart sounds: No murmur heard.  Pulmonary:      Effort: Pulmonary effort is normal. No respiratory distress.      Breath sounds: Normal breath sounds. No wheezing.   Abdominal:      General: Bowel sounds are normal. There is no distension.      Palpations: Abdomen is soft. There is no mass.      Tenderness: There is no abdominal tenderness.    Musculoskeletal:      Cervical back: Normal range of motion and neck supple.      Right lower leg: No edema.      Left lower leg: No edema.   Skin:     General: Skin is warm and dry.      Findings: No rash.   Neurological:      Mental Status: She is alert and oriented to person, place, and time.      Cranial Nerves: No cranial nerve deficit.   Psychiatric:         Behavior: Behavior normal.          Significant Labs:  CBC:   Recent Labs   Lab 08/15/24  0005 08/15/24  0645   WBC  --  9.63   HGB 9.3* 9.3*  9.3*   HCT 29.2* 28.8*  28.8*   PLT  --  211     CMP:   Recent Labs   Lab 08/15/24  0645   *   CALCIUM 8.9   ALBUMIN 3.5   PROT 6.4      K 3.8   CO2 23      BUN 20   CREATININE 0.8   ALKPHOS 80   ALT 15   AST 14   BILITOT 0.7     Coagulation:   Recent Labs   Lab 08/14/24  2152   INR 1.0       Significant Imaging:  Imaging results within the past 24 hours have been reviewed.  Assessment/Plan:     Cardiac/Vascular  Coronary artery disease involving native coronary artery of native heart  CABG was many years ago. No recent acute events.     GI  Upper GI bleed  No melena since yesterday evening and Hgb stable.   Ok to start diet and will plan for EGD next week after Plavix held 5 days. Last Plavix 08/13/24.  Continue Protonix bid.      Thank you for your consult. I will follow-up with patient. Please contact us if you have any additional questions.    Pan Pena PA-C  Gastroenterology  [facility] Tele[facility] ([facility])

## 2024-08-15 NOTE — ASSESSMENT & PLAN NOTE
"Patient's FSGs are uncontrolled due to hyperglycemia on current medication regimen.  Last A1c reviewed-   Lab Results   Component Value Date    HGBA1C 9.0 (H) 06/11/2024     Most recent fingerstick glucose reviewed- No results for input(s): "POCTGLUCOSE" in the last 24 hours.  Current correctional scale  Medium  Maintain anti-hyperglycemic dose as follows-   Antihyperglycemics (From admission, onward)      Start     Stop Route Frequency Ordered    08/14/24 2206  insulin aspart U-100 pen 0-10 Units         -- SubQ Every 6 hours PRN 08/14/24 2106          Hold Oral hypoglycemics while patient is in the hospital.  -hold glipizide and Glucophage  -given significantly elevated A1c, patient would likely benefit from basal insulin at discharge  -currently NPO with cautious IV fluids  "

## 2024-08-15 NOTE — HOSPITAL COURSE
Pt had no further melena after admission, no overt s/s of bleeding.     She was evaluated by GI who recommended outpatient EGD after patient has been off Plavix x 5  days as she has had no further bleeding and H/H stable at 9.2-9.3- discussed with GI Dr. Valencia- pt deemed stable for discharge from GI standpoint. Pt is scheduled for outpatient endoscopy with Dr. Cotto on 08/20/2024. Started on Protonix 40 daily x 30 days on discharge.     Pt seen and examined on day of discharge with son at bedside. She is sitting up in chair, in NAD. Denies nausea, vomiting. Had formed BM earlier this afternoon. Appears stable for discharge home today per my exam with close outpatient GI follow up as above. Advised to hold Plavix on discharge until GI followup. Discharge instructions, medications, followup and return precautions discussed at length with patient and son, all questions answered.

## 2024-08-19 PROBLEM — Z87.19 HISTORY OF MELENA: Status: ACTIVE | Noted: 2024-08-19

## 2024-08-20 ENCOUNTER — ANESTHESIA EVENT (OUTPATIENT)
Dept: ENDOSCOPY | Facility: HOSPITAL | Age: 84
End: 2024-08-20
Payer: MEDICARE

## 2024-08-20 ENCOUNTER — ANESTHESIA (OUTPATIENT)
Dept: ENDOSCOPY | Facility: HOSPITAL | Age: 84
End: 2024-08-20
Payer: MEDICARE

## 2024-08-20 ENCOUNTER — HOSPITAL ENCOUNTER (OUTPATIENT)
Facility: HOSPITAL | Age: 84
Discharge: HOME | End: 2024-08-20
Attending: INTERNAL MEDICINE | Admitting: INTERNAL MEDICINE
Payer: MEDICARE

## 2024-08-20 VITALS
TEMPERATURE: 98 F | SYSTOLIC BLOOD PRESSURE: 178 MMHG | HEART RATE: 76 BPM | WEIGHT: 148 LBS | RESPIRATION RATE: 16 BRPM | DIASTOLIC BLOOD PRESSURE: 80 MMHG | BODY MASS INDEX: 27.23 KG/M2 | HEIGHT: 62 IN | OXYGEN SATURATION: 96 %

## 2024-08-20 DIAGNOSIS — Z87.19 HISTORY OF MELENA: ICD-10-CM

## 2024-08-20 LAB
ALBUMIN SERPL BCP-MCNC: 3.7 G/DL (ref 3.5–5.2)
ALP SERPL-CCNC: 100 U/L (ref 55–135)
ALT SERPL W/O P-5'-P-CCNC: 11 U/L (ref 10–44)
ANION GAP SERPL CALC-SCNC: 13 MMOL/L (ref 8–16)
AST SERPL-CCNC: 12 U/L (ref 10–40)
BASOPHILS # BLD AUTO: 0.07 K/UL (ref 0–0.2)
BASOPHILS NFR BLD: 0.7 % (ref 0–1.9)
BILIRUB SERPL-MCNC: 0.7 MG/DL (ref 0.1–1)
BUN SERPL-MCNC: 14 MG/DL (ref 8–23)
CALCIUM SERPL-MCNC: 9.5 MG/DL (ref 8.7–10.5)
CHLORIDE SERPL-SCNC: 105 MMOL/L (ref 95–110)
CO2 SERPL-SCNC: 22 MMOL/L (ref 23–29)
CREAT SERPL-MCNC: 0.9 MG/DL (ref 0.5–1.4)
DIFFERENTIAL METHOD BLD: ABNORMAL
EOSINOPHIL # BLD AUTO: 0.3 K/UL (ref 0–0.5)
EOSINOPHIL NFR BLD: 3.3 % (ref 0–8)
ERYTHROCYTE [DISTWIDTH] IN BLOOD BY AUTOMATED COUNT: 13.4 % (ref 11.5–14.5)
EST. GFR  (NO RACE VARIABLE): >60 ML/MIN/1.73 M^2
GLUCOSE SERPL-MCNC: 212 MG/DL (ref 70–110)
GLUCOSE SERPL-MCNC: 245 MG/DL (ref 70–110)
HCT VFR BLD AUTO: 28.9 % (ref 37–48.5)
HGB BLD-MCNC: 9.4 G/DL (ref 12–16)
IMM GRANULOCYTES # BLD AUTO: 0.04 K/UL (ref 0–0.04)
IMM GRANULOCYTES NFR BLD AUTO: 0.4 % (ref 0–0.5)
INR PPP: 1 (ref 0.8–1.2)
LYMPHOCYTES # BLD AUTO: 1.8 K/UL (ref 1–4.8)
LYMPHOCYTES NFR BLD: 19.5 % (ref 18–48)
MCH RBC QN AUTO: 27.6 PG (ref 27–31)
MCHC RBC AUTO-ENTMCNC: 32.5 G/DL (ref 32–36)
MCV RBC AUTO: 85 FL (ref 82–98)
MONOCYTES # BLD AUTO: 0.7 K/UL (ref 0.3–1)
MONOCYTES NFR BLD: 7.2 % (ref 4–15)
NEUTROPHILS # BLD AUTO: 6.5 K/UL (ref 1.8–7.7)
NEUTROPHILS NFR BLD: 68.9 % (ref 38–73)
NRBC BLD-RTO: 0 /100 WBC
PLATELET # BLD AUTO: 308 K/UL (ref 150–450)
PLATELET BLD QL SMEAR: ABNORMAL
PMV BLD AUTO: 9.3 FL (ref 9.2–12.9)
POCT GLUCOSE: 245 MG/DL (ref 70–110)
POTASSIUM SERPL-SCNC: 4.1 MMOL/L (ref 3.5–5.1)
PROT SERPL-MCNC: 7 G/DL (ref 6–8.4)
PROTHROMBIN TIME: 10.8 SEC (ref 9–12.5)
RBC # BLD AUTO: 3.4 M/UL (ref 4–5.4)
SODIUM SERPL-SCNC: 140 MMOL/L (ref 136–145)
WBC # BLD AUTO: 9.42 K/UL (ref 3.9–12.7)

## 2024-08-20 PROCEDURE — 88305 TISSUE EXAM BY PATHOLOGIST: CPT | Mod: 59 | Performed by: PATHOLOGY

## 2024-08-20 PROCEDURE — 88342 IMHCHEM/IMCYTCHM 1ST ANTB: CPT | Performed by: PATHOLOGY

## 2024-08-20 PROCEDURE — 25000003 PHARM REV CODE 250: Performed by: INTERNAL MEDICINE

## 2024-08-20 PROCEDURE — 37000009 HC ANESTHESIA EA ADD 15 MINS: Performed by: INTERNAL MEDICINE

## 2024-08-20 PROCEDURE — 27201012 HC FORCEPS, HOT/COLD, DISP: Performed by: INTERNAL MEDICINE

## 2024-08-20 PROCEDURE — 88342 IMHCHEM/IMCYTCHM 1ST ANTB: CPT | Mod: 26,,, | Performed by: PATHOLOGY

## 2024-08-20 PROCEDURE — 80053 COMPREHEN METABOLIC PANEL: CPT | Performed by: INTERNAL MEDICINE

## 2024-08-20 PROCEDURE — 88305 TISSUE EXAM BY PATHOLOGIST: CPT | Mod: 26,,, | Performed by: PATHOLOGY

## 2024-08-20 PROCEDURE — 63600175 PHARM REV CODE 636 W HCPCS: Performed by: NURSE ANESTHETIST, CERTIFIED REGISTERED

## 2024-08-20 PROCEDURE — 25000003 PHARM REV CODE 250: Performed by: NURSE ANESTHETIST, CERTIFIED REGISTERED

## 2024-08-20 PROCEDURE — 85025 COMPLETE CBC W/AUTO DIFF WBC: CPT | Performed by: INTERNAL MEDICINE

## 2024-08-20 PROCEDURE — 43239 EGD BIOPSY SINGLE/MULTIPLE: CPT | Mod: ,,, | Performed by: INTERNAL MEDICINE

## 2024-08-20 PROCEDURE — 85610 PROTHROMBIN TIME: CPT | Performed by: INTERNAL MEDICINE

## 2024-08-20 PROCEDURE — 37000008 HC ANESTHESIA 1ST 15 MINUTES: Performed by: INTERNAL MEDICINE

## 2024-08-20 PROCEDURE — 43239 EGD BIOPSY SINGLE/MULTIPLE: CPT | Performed by: INTERNAL MEDICINE

## 2024-08-20 RX ORDER — PROPOFOL 10 MG/ML
VIAL (ML) INTRAVENOUS
Status: DISCONTINUED | OUTPATIENT
Start: 2024-08-20 | End: 2024-08-20

## 2024-08-20 RX ORDER — DEXTROMETHORPHAN/PSEUDOEPHED 2.5-7.5/.8
DROPS ORAL
Status: COMPLETED | OUTPATIENT
Start: 2024-08-20 | End: 2024-08-20

## 2024-08-20 RX ORDER — LIDOCAINE HYDROCHLORIDE 10 MG/ML
INJECTION, SOLUTION EPIDURAL; INFILTRATION; INTRACAUDAL; PERINEURAL
Status: DISCONTINUED | OUTPATIENT
Start: 2024-08-20 | End: 2024-08-20

## 2024-08-20 RX ADMIN — SODIUM CHLORIDE, POTASSIUM CHLORIDE, SODIUM LACTATE AND CALCIUM CHLORIDE: 600; 310; 30; 20 INJECTION, SOLUTION INTRAVENOUS at 11:08

## 2024-08-20 RX ADMIN — LIDOCAINE HYDROCHLORIDE 50 MG: 10 SOLUTION INTRAVENOUS at 11:08

## 2024-08-20 RX ADMIN — PROPOFOL 20 MG: 10 INJECTION, EMULSION INTRAVENOUS at 11:08

## 2024-08-20 RX ADMIN — PROPOFOL 100 MG: 10 INJECTION, EMULSION INTRAVENOUS at 11:08

## 2024-08-20 NOTE — TRANSFER OF CARE
"Anesthesia Transfer of Care Note    Patient: Gia Perez    Procedure(s) Performed: Procedure(s) (LRB):  EGD (ESOPHAGOGASTRODUODENOSCOPY) (N/A)    Patient location: PACU    Anesthesia Type: MAC    Transport from OR: Transported from OR on room air with adequate spontaneous ventilation    Post pain: adequate analgesia    Post assessment: no apparent anesthetic complications and tolerated procedure well    Post vital signs: stable    Level of consciousness: sedated    Nausea/Vomiting: no nausea/vomiting    Complications: none    Transfer of care protocol was followed    Last vitals: Visit Vitals  BP (!) 193/82 (BP Location: Left arm, Patient Position: Lying)   Pulse 77   Temp 36.4 °C (97.5 °F) (Temporal)   Resp 17   Ht 5' 2" (1.575 m)   Wt 67.1 kg (148 lb)   SpO2 99%   Breastfeeding No   BMI 27.07 kg/m²     "

## 2024-08-20 NOTE — PROVATION PATIENT INSTRUCTIONS
Discharge Summary/Instructions after an Endoscopic Procedure  Patient Name: Gia Perez  Patient MRN: [id number]  Patient YOB: 1940 Tuesday, August 20, 2024 Kate Cotto MD  Dear patient,  As a result of recent federal legislation (The Federal Cures Act), you may   receive lab or pathology results from your procedure in your [facility]   account before your physician is able to contact you. Your physician or   their representative will relay the results to you with their   recommendations at their soonest availability.  Thank you,  RESTRICTIONS:  During your procedure today, you received medications for sedation.  These   medications may affect your judgment, balance and coordination.  Therefore,   for 24 hours, you have the following restrictions:   - DO NOT drive a car, operate machinery, make legal/financial decisions,   sign important papers or drink alcohol.    ACTIVITY:  Today: no heavy lifting, straining or running due to procedural   sedation/anesthesia.  The following day: return to full activity including work.  DIET:  Eat and drink normally unless instructed otherwise.     TREATMENT FOR COMMON SIDE EFFECTS:  - Mild abdominal pain, nausea, belching, bloating or excessive gas:  rest,   eat lightly and use a heating pad.  - Sore Throat: treat with throat lozenges and/or gargle with warm salt   water.  - Because air was used during the procedure, expelling large amounts of air   from your rectum or belching is normal.  - If a bowel prep was taken, you may not have a bowel movement for 1-3 days.    This is normal.  SYMPTOMS TO WATCH FOR AND REPORT TO YOUR PHYSICIAN:  1. Abdominal pain or bloating, other than gas cramps.  2. Chest pain.  3. Back pain.  4. Signs of infection such as: chills or fever occurring within 24 hours   after the procedure.  5. Rectal bleeding, which would show as bright red, maroon, or black stools.   (A tablespoon of blood from the rectum is not serious, especially if    hemorrhoids are present.)  6. Vomiting.  7. Weakness or dizziness.  GO DIRECTLY TO THE NEAREST EMERGENCY ROOM IF YOU HAVE ANY OF THE FOLLOWING:      Difficulty breathing              Chills and/or fever over 101 F   Persistent vomiting and/or vomiting blood   Severe abdominal pain   Severe chest pain   Black, tarry stools   Bleeding- more than one tablespoon   Any other symptom or condition that you feel may need urgent attention  Your doctor recommends these additional instructions:  If any biopsies were taken, your doctors clinic will contact you in 1 to 2   weeks with any results.  - Discharge patient to home (with escort).   - Resume previous diet.   - Continue present medications.   - Resume Plavix (clopidogrel) at prior dose in 2 days.  Refer to primary   physician for further adjustment of therapy.   - Await pathology results.   - Return to GI clinic with Ms. Pan Pena PA-C.  For questions, problems or results please call your physician Kate Cotto MD at Work:  [phone]  If you have any questions about the above instructions, call the GI   department at [phone] or call the endoscopy unit at [phone]   from 7am until 3 pm.  [facility], EMERGENCY ROOM PHONE NUMBER:   [phone]  IF A COMPLICATION OR EMERGENCY SITUATION ARISES AND YOU ARE UNABLE TO REACH   YOUR PHYSICIAN - GO DIRECTLY TO THE EMERGENCY ROOM.  I have read or have had read to me these discharge instructions for my   procedure and have received a written copy.  I understand these   instructions and will follow-up with my physician if I have any questions.     __________________________________       _____________________________________  Nurse Signature                                          Patient/Designated   Responsible Party Signature  MD Kate Rubin MD  8/20/2024 11:37:55 AM  This report has been verified and signed electronically.  Dear patient,  As a result of  recent federal legislation (The Federal Cures Act), you may   receive lab or pathology results from your procedure in your [facility]   account before your physician is able to contact you. Your physician or   their representative will relay the results to you with their   recommendations at their soonest availability.  Thank you,  PROVATION

## 2024-08-20 NOTE — LETTER
Gia Ribeiro [place]  [address] [place] [address]      [facility] ENDOSCOPY PATIENT INSTRUCTIONS  You are scheduled for a/an EGD (Procedure), on Tuesday (Day), 8/20/2024 (Date).       Your arrival time is 09:00 AM.                 Your procedure will be performed at [facility] ([facility] The hospital is located at [address], off [address] ([place]). Once on [address], [facility] is the second building (Entrance #2) on the left. Check in for your procedure on the 1st floor.       UPPER ENDOSCOPY/ERCP/BRONCHOSCOPY PROCEDURES:   You may not have anything to eat or drink after midnight. You make take your morning medications with a small sip of water.    ALL PATIENTS:   Please plan to be at the hospital for 3 - 4 hours.   Use of Anesthesia requires you to have a responsible person to drive you to the hospital, stay while the procedure is being performed, assume responsibility for your care at discharge, and drive you home. You should not operate a vehicle, machinery or sign any legal documents until the next day. YOU MUST HAVE A RESPONSIBLE PERSON TO DRIVE YOU HOME.  Leave all valuables at home, including jewelry. (Piercings must be removed) You will need to bring your 's license, medical insurance card, and a method of payment. You will be responsible for any co-payment at time of registration - Please reach out to Financial Services if you have any questions or concerns.   If biopsies need to be performed or a polyp needs to be removed, this may result in a change in the billing of your procedure and could impact your payment responsibility depending on your insurance provider.   Wear clothing appropriate for easy re-dressing after sedation.   Please bring a complete list of all medications you are taking.     MEDICATIONS:  BLOOD THINNING MEDICATION (Coumadin, Plavix, Eliquis, etc.):  If you are on a blood thinning medication, our scheduling team  will obtain clearance to hold from your prescribing physician. Please do not stop these medications until it is approved by your provider. Our scheduling nurse will notify you of an appropriate date and time to hold prior to your procedure.  Coumadin (WARFARIN), Plavix (CLOPIDOGREL), and Effient (PRASUGREL) MUST be stopped 5 days prior to exam unless discussed with the doctor performing the test.   Eliquis (APIXABAN), Savaysa (EDOXABAN), Arixtra (FONDAPARINUX), and Xarelto (RIVAROXABAN) MUST be stopped 2 days prior to exam unless discussed with the doctor performing the test.  WEIGHT LOSS MEDICATIONS - MUST be stopped 1week prior to your appointment  BLOOD PRESSURE, HEART, SEIZURE, LUNG, or PSYCHIATRIC MEDICATIONS you normally take in the morning, please take them the morning of your procedure. This includes Inhalers.   Please take these medications one hour prior to your arrival time with a small sip of water    DIABETIC PATIENTS:   Do not take any diabetic medications (including insulin) the morning of the exam.   If your blood sugar goes below 70, you may drink 2 ounces of clear juice, soda. Wait 15 minutes, then recheck your blood sugar. If it isn't going up, you may drink another 2 ounces of clear juice and contact the On-Call Nurse line at [phone].     Questions regarding scheduling: Endoscopy Scheduling [phone] (M-F, 7:30am-4:30pm)   Questions about Insurance/ Financial obligations: Financial Services [phone]   Questions requiring immediate assistance: [facility] On-Call Nurse Line [phone] (After Hours)

## 2024-08-20 NOTE — INTERVAL H&P NOTE
The patient has been examined and the H&P has been reviewed:    I concur with the findings and changes have been noted since the H&P was written: held Plavix. No further melena. H&H today stable 9.4/28.9, similar to last week's labs. No abdominal pain. Remote hx of EGD for gastritis in the past. No NSAIDs.  Multiple family members at the bedside.     Anesthesia/Surgery risks, benefits and alternative options discussed and understood by patient/family.      The risks, benefits and alternatives of the procedure were discussed with the patient in detail. This discussion was had in the presence of daughter, son and SE. The risks include, risks of adverse reaction to sedation requiring the use of reversal agents, bleeding requiring blood transfusion, perforation requiring surgical intervention and technical failure. Other risks include aspiration leading to respiratory distress and respiratory failure resulting in endotracheal intubation and mechanical ventilation including death. If anesthesia is being utilized for this procedure, it is up to the anesthesiologist to determine airway safety including elective endotracheal intubation. Questions were answered, they agree to proceed. There was no language barriers.      Active Hospital Problems    Diagnosis  POA    *History of melena [Z87.19]  Not Applicable     Hospitalized 8/14/24 to 8/15/24 for melena. On Plavix until 8/15/20. No further melena at discharge. H&H dropped from 12.1/38.5 to 9.2/27.5        Resolved Hospital Problems   No resolved problems to display.

## 2024-08-20 NOTE — ANESTHESIA POSTPROCEDURE EVALUATION
Anesthesia Post Evaluation    Patient: Gia Perez    Procedure(s) Performed: Procedure(s) (LRB):  EGD (ESOPHAGOGASTRODUODENOSCOPY) (N/A)    Final Anesthesia Type: MAC      Patient location during evaluation: PACU  Patient participation: Yes- Able to Participate  Level of consciousness: awake  Post-procedure vital signs: reviewed and stable  Pain management: adequate  Airway patency: patent    PONV status at discharge: No PONV  Anesthetic complications: no      Cardiovascular status: blood pressure returned to baseline and hemodynamically stable  Respiratory status: unassisted and spontaneous ventilation  Hydration status: euvolemic  Follow-up not needed.          Vitals Value Taken Time   /62 08/20/24 1136   Temp 36.4 °C (97.6 °F) 08/20/24 1136   Pulse 97 08/20/24 1136   Resp 16 08/20/24 1136   SpO2 97 % 08/20/24 1136         No case tracking events are documented in the log.      Pain/Trupti Score: Trupti Score: 10 (8/20/2024 11:36 AM)

## 2024-08-20 NOTE — ANESTHESIA PREPROCEDURE EVALUATION
08/20/2024  Gia Perez is a 84 y.o., female.      Pre-op Assessment    I have reviewed the Patient Summary Reports.     I have reviewed the Nursing Notes. I have reviewed the NPO Status.   I have reviewed the Medications.     Review of Systems  Anesthesia Hx:  No problems with previous Anesthesia             Denies Family Hx of Anesthesia complications.    Denies Personal Hx of Anesthesia complications.                    Social:  Non-Smoker, No Alcohol Use       Hematology/Oncology:    Oncology Normal    -- Anemia:                                  Cardiovascular:     Hypertension Valvular problems/Murmurs, AS  CAD   CABG/stent       PVD hyperlipidemia    Moderate aortic stenosis                         Pulmonary:    Denies COPD.  Denies Asthma.     Denies Sleep Apnea.                Renal/:  Renal/ Normal                 Hepatic/GI:      Denies GERD. Denies Liver Disease.  Denies Hepatitis.           Musculoskeletal:  Musculoskeletal Normal                Neurological:    Denies CVA.    Denies Seizures.                                Endocrine:  Diabetes Denies Hypothyroidism.  Denies Hyperthyroidism.           Physical Exam  General: Well nourished    Airway:  Mallampati: III   Mouth Opening: Small, but > 3cm    Dental:  Dentures    Chest/Lungs:  Clear to auscultation, Normal Respiratory Rate    Heart:  Rate: Normal  Rhythm: Regular Rhythm    Anesthesia Plan  Type of Anesthesia, risks & benefits discussed:    Anesthesia Type: MAC  Intra-op Monitoring Plan: Standard ASA Monitors  Induction:  IV  Informed Consent: Informed consent signed with the Patient and all parties understand the risks and agree with anesthesia plan.  All questions answered.   ASA Score: 2  Day of Surgery Review of History & Physical: H&P Update referred to the surgeon/provider.    Ready For Surgery From Anesthesia  Perspective.   .

## 2024-08-21 ENCOUNTER — TELEPHONE (OUTPATIENT)
Dept: GASTROENTEROLOGY | Facility: CLINIC | Age: 84
End: 2024-08-21
Payer: MEDICARE

## 2024-08-21 NOTE — TELEPHONE ENCOUNTER
----- Message from Kate Cotto MD sent at 8/20/2024 11:51 AM CDT -----  Regarding: GI clinic follow up  Please arrange hospital follow up with Pan for this patient. She had a EGD today that was arranged for melena. Patient was hospitalized last week and came off Plavix for today's procedure. No cause found. Biopsies taken. Needs follow up to discuss colonoscopy vs pillcam vs other.

## 2024-08-23 DIAGNOSIS — B96.81 HELICOBACTER PYLORI GASTRITIS: Primary | ICD-10-CM

## 2024-08-23 DIAGNOSIS — K29.70 HELICOBACTER PYLORI GASTRITIS: Primary | ICD-10-CM

## 2024-08-23 LAB
COMMENT: NORMAL
FINAL PATHOLOGIC DIAGNOSIS: NORMAL
GROSS: NORMAL
Lab: NORMAL

## 2024-08-23 RX ORDER — CLARITHROMYCIN 500 MG/1
500 TABLET, FILM COATED ORAL EVERY 12 HOURS
Qty: 28 TABLET | Refills: 0 | Status: SHIPPED | OUTPATIENT
Start: 2024-08-23 | End: 2024-09-06

## 2024-08-23 RX ORDER — PANTOPRAZOLE SODIUM 40 MG/1
40 TABLET, DELAYED RELEASE ORAL 2 TIMES DAILY
Qty: 28 TABLET | Refills: 0 | Status: SHIPPED | OUTPATIENT
Start: 2024-08-23 | End: 2024-09-06

## 2024-08-23 RX ORDER — AMOXICILLIN 500 MG/1
1000 TABLET, FILM COATED ORAL EVERY 12 HOURS
Qty: 56 TABLET | Refills: 0 | Status: SHIPPED | OUTPATIENT
Start: 2024-08-23 | End: 2024-09-06

## 2024-08-24 NOTE — PROGRESS NOTES
Contacted patient and informed her of results via telephone. Advised she has H. Pylori in her stomach biopsies. This is a bacteria that causes gastritis (inflammation), ulcers and in rare cases gastric cancer. I recommend treating the infection with antibiotics. A prescription was sent to her pharmacy ([facility] in [place]). Advised to take the antibiotics until they are gone. The acid blocker dose (Pantoprazole) was changed to twice a day while taking the antibiotics. Informed in two months I request she submit a stool sample to confirm the H. Pylori bacteria was eradicated.     Thanks for trusting us with your healthcare needs and using [facility].     Sincerely,    Kate Cotto M.D.

## 2024-08-26 ENCOUNTER — TELEPHONE (OUTPATIENT)
Dept: GASTROENTEROLOGY | Facility: CLINIC | Age: 84
End: 2024-08-26
Payer: MEDICARE

## 2024-08-26 NOTE — TELEPHONE ENCOUNTER
Duplicated message. Called and spoke to pts son, Eugenio Perez, about follow up appt. Scheduled with Dr Yadav on 09/20/24. He agreed to plan.

## 2024-08-26 NOTE — TELEPHONE ENCOUNTER
Called and spoke to the pts son, Eugenio Perez, he states they can not make the appt that is scheduled for today but does want to reschedule it.     Appt scheduled for next available on 09/20/24. Stressed the importance of completing the Tx for H. Pylori, instructions reiterated. He agreed to plan and verbalized understanding offered to set up by my chart so he can do a virtual appt if it makes it easier for them but he refused and said he will just bring her in but only wants to go to [place].

## 2024-08-27 ENCOUNTER — TELEPHONE (OUTPATIENT)
Dept: GASTROENTEROLOGY | Facility: CLINIC | Age: 84
End: 2024-08-27
Payer: MEDICARE

## 2024-08-27 NOTE — TELEPHONE ENCOUNTER
----- Message from Leena Leija sent at 8/27/2024 11:33 AM CDT -----  Regarding: Patient Returning Missed Call  Contact: Ray  Patients son is returning missed call . Pts call back- [phone]

## 2024-08-27 NOTE — TELEPHONE ENCOUNTER
Got an incoming fax from [employer].     Per [facility]: Drug-Drug interaction between clarithromycin and atorvastatin, risk of myopathy and rhabdomyolysis. Avoid concurrent use with simvastatin. If concurrent use with atorvastatin is necessary, the dose of atorvastatin should not exceed 20 mg/day.     Please review and discontinue, if clinically warranted.

## 2024-08-27 NOTE — TELEPHONE ENCOUNTER
Spoke to pts son, Eugenio Perez, advised him of the recommendations to hold the atorvastatin while taking the clarithromycin, then restart it after completing the clarithromycin. He agreed to plan and verbalized understanding.